# Patient Record
Sex: MALE | Race: WHITE | NOT HISPANIC OR LATINO
[De-identification: names, ages, dates, MRNs, and addresses within clinical notes are randomized per-mention and may not be internally consistent; named-entity substitution may affect disease eponyms.]

---

## 2021-01-25 ENCOUNTER — TRANSCRIPTION ENCOUNTER (OUTPATIENT)
Age: 71
End: 2021-01-25

## 2021-12-09 ENCOUNTER — TRANSCRIPTION ENCOUNTER (OUTPATIENT)
Age: 71
End: 2021-12-09

## 2023-04-24 ENCOUNTER — OFFICE (OUTPATIENT)
Dept: URBAN - METROPOLITAN AREA CLINIC 97 | Facility: CLINIC | Age: 73
Setting detail: OPHTHALMOLOGY
End: 2023-04-24
Payer: MEDICARE

## 2023-04-24 DIAGNOSIS — H35.362: ICD-10-CM

## 2023-04-24 DIAGNOSIS — H26.493: ICD-10-CM

## 2023-04-24 DIAGNOSIS — D31.31: ICD-10-CM

## 2023-04-24 DIAGNOSIS — H35.373: ICD-10-CM

## 2023-04-24 DIAGNOSIS — Z96.1: ICD-10-CM

## 2023-04-24 PROBLEM — H35.363 DRUSEN; BOTH EYES: Status: ACTIVE | Noted: 2023-04-24

## 2023-04-24 PROCEDURE — 92014 COMPRE OPH EXAM EST PT 1/>: CPT | Performed by: OPHTHALMOLOGY

## 2023-04-24 PROCEDURE — 92134 CPTRZ OPH DX IMG PST SGM RTA: CPT | Performed by: OPHTHALMOLOGY

## 2023-04-24 ASSESSMENT — AXIALLENGTH_DERIVED
OS_AL: 23.6672
OS_AL: 23.6183
OD_AL: 23.7912
OS_AL: 23.6183
OS_AL: 22.636

## 2023-04-24 ASSESSMENT — REFRACTION_MANIFEST
OS_SPHERE: +3.00
OD_ADD: +2.75
OD_VA2: 20/20(J1+)
OU_VA: 20/20
OS_ADD: +3.00
OS_AXIS: 175
OS_CYLINDER: +1.00
OS_VA1: 20/30+2
OS_CYLINDER: +1.00
OS_VA2: 20/20(J1+)
OD_VA1: 20/20-
OD_CYLINDER: SPH
OS_SPHERE: +0.25
OD_SPHERE: -0.50
OS_AXIS: 175
OS_SPHERE: +0.25
OS_VA1: 20/30+2
OD_VA1: 20/20-
OD_CYLINDER: SPH
OS_ADD: +2.75
OU_VA: 20/20
OD_ADD: +3.00
OD_SPHERE: -0.50
OS_AXIS: 175
OD_VA2: 20/20(J1+)
OS_CYLINDER: +1.25
OD_CYLINDER: SPH
OS_VA2: 20/20(J1+)
OD_SPHERE: +2.25

## 2023-04-24 ASSESSMENT — KERATOMETRY
OS_K2POWER_DIOPTERS: 43.25
OD_K1POWER_DIOPTERS: 43.25
OD_K2POWER_DIOPTERS: 44.00
OS_AXISANGLE_DEGREES: 168
METHOD_AUTO_MANUAL: AUTO
OS_K1POWER_DIOPTERS: 41.75
OD_AXISANGLE_DEGREES: 037

## 2023-04-24 ASSESSMENT — SPHEQUIV_DERIVED
OS_SPHEQUIV: 0.875
OS_SPHEQUIV: 0.875
OS_SPHEQUIV: 3.5
OD_SPHEQUIV: -0.625
OS_SPHEQUIV: 0.75

## 2023-04-24 ASSESSMENT — CONFRONTATIONAL VISUAL FIELD TEST (CVF)
OS_FINDINGS: FULL
OD_FINDINGS: FULL

## 2023-04-24 ASSESSMENT — REFRACTION_AUTOREFRACTION
OS_AXIS: 173
OD_CYLINDER: +1.25
OS_CYLINDER: +1.25
OS_SPHERE: +0.25
OD_SPHERE: -1.25
OD_AXIS: 018

## 2023-04-24 ASSESSMENT — VISUAL ACUITY
OS_BCVA: 20/25-2
OD_BCVA: 20/40+2

## 2023-10-09 ENCOUNTER — OFFICE (OUTPATIENT)
Dept: URBAN - METROPOLITAN AREA CLINIC 97 | Facility: CLINIC | Age: 73
Setting detail: OPHTHALMOLOGY
End: 2023-10-09
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
## 2023-10-09 PROCEDURE — NO SHOW FE NO SHOW FEE: Performed by: OPHTHALMOLOGY

## 2024-05-03 ENCOUNTER — OFFICE (OUTPATIENT)
Dept: URBAN - METROPOLITAN AREA CLINIC 8 | Facility: CLINIC | Age: 74
Setting detail: OPHTHALMOLOGY
End: 2024-05-03
Payer: MEDICARE

## 2024-05-03 DIAGNOSIS — Z96.1: ICD-10-CM

## 2024-05-03 DIAGNOSIS — H35.363: ICD-10-CM

## 2024-05-03 DIAGNOSIS — H26.493: ICD-10-CM

## 2024-05-03 DIAGNOSIS — H35.373: ICD-10-CM

## 2024-05-03 DIAGNOSIS — D31.31: ICD-10-CM

## 2024-05-03 PROCEDURE — 92134 CPTRZ OPH DX IMG PST SGM RTA: CPT | Performed by: OPHTHALMOLOGY

## 2024-05-03 PROCEDURE — 99214 OFFICE O/P EST MOD 30 MIN: CPT | Performed by: OPHTHALMOLOGY

## 2024-05-03 ASSESSMENT — CONFRONTATIONAL VISUAL FIELD TEST (CVF)
OS_FINDINGS: FULL
OD_FINDINGS: FULL

## 2024-05-15 ENCOUNTER — EMERGENCY (EMERGENCY)
Facility: HOSPITAL | Age: 74
LOS: 1 days | Discharge: HOME | End: 2024-05-15
Attending: EMERGENCY MEDICINE
Payer: COMMERCIAL

## 2024-05-15 VITALS
TEMPERATURE: 98 F | DIASTOLIC BLOOD PRESSURE: 82 MMHG | RESPIRATION RATE: 17 BRPM | OXYGEN SATURATION: 99 % | HEART RATE: 57 BPM | SYSTOLIC BLOOD PRESSURE: 158 MMHG

## 2024-05-15 VITALS
HEIGHT: 67 IN | HEART RATE: 69 BPM | TEMPERATURE: 98 F | RESPIRATION RATE: 18 BRPM | SYSTOLIC BLOOD PRESSURE: 173 MMHG | WEIGHT: 164.02 LBS | DIASTOLIC BLOOD PRESSURE: 92 MMHG | OXYGEN SATURATION: 99 %

## 2024-05-15 PROCEDURE — 73020 X-RAY EXAM OF SHOULDER: CPT | Mod: 26,59,LT

## 2024-05-15 PROCEDURE — 73030 X-RAY EXAM OF SHOULDER: CPT

## 2024-05-15 PROCEDURE — 71045 X-RAY EXAM CHEST 1 VIEW: CPT

## 2024-05-15 PROCEDURE — 72125 CT NECK SPINE W/O DYE: CPT | Mod: 26,MC

## 2024-05-15 PROCEDURE — 72125 CT NECK SPINE W/O DYE: CPT | Mod: MC

## 2024-05-15 PROCEDURE — 73562 X-RAY EXAM OF KNEE 3: CPT | Mod: 26,LT

## 2024-05-15 PROCEDURE — 70450 CT HEAD/BRAIN W/O DYE: CPT | Mod: MC

## 2024-05-15 PROCEDURE — 99285 EMERGENCY DEPT VISIT HI MDM: CPT

## 2024-05-15 PROCEDURE — 73562 X-RAY EXAM OF KNEE 3: CPT

## 2024-05-15 PROCEDURE — 72170 X-RAY EXAM OF PELVIS: CPT

## 2024-05-15 PROCEDURE — 70450 CT HEAD/BRAIN W/O DYE: CPT | Mod: 26,MC

## 2024-05-15 PROCEDURE — 71045 X-RAY EXAM CHEST 1 VIEW: CPT | Mod: 26

## 2024-05-15 PROCEDURE — 99284 EMERGENCY DEPT VISIT MOD MDM: CPT | Mod: 25

## 2024-05-15 PROCEDURE — 72170 X-RAY EXAM OF PELVIS: CPT | Mod: 26

## 2024-05-15 PROCEDURE — 73030 X-RAY EXAM OF SHOULDER: CPT | Mod: 26,LT

## 2024-05-15 PROCEDURE — 73020 X-RAY EXAM OF SHOULDER: CPT

## 2024-05-15 RX ORDER — CYCLOBENZAPRINE HYDROCHLORIDE 10 MG/1
1 TABLET, FILM COATED ORAL
Qty: 5 | Refills: 0
Start: 2024-05-15 | End: 2024-05-19

## 2024-05-15 RX ORDER — ACETAMINOPHEN 500 MG
650 TABLET ORAL ONCE
Refills: 0 | Status: COMPLETED | OUTPATIENT
Start: 2024-05-15 | End: 2024-05-15

## 2024-05-15 RX ORDER — LISINOPRIL 2.5 MG/1
1 TABLET ORAL
Qty: 2 | Refills: 0
Start: 2024-05-15 | End: 2024-05-16

## 2024-05-15 RX ORDER — MELOXICAM 15 MG/1
1 TABLET ORAL
Qty: 7 | Refills: 0
Start: 2024-05-15 | End: 2024-05-21

## 2024-05-15 RX ORDER — LIDOCAINE 4 G/100G
1 CREAM TOPICAL
Qty: 1 | Refills: 0
Start: 2024-05-15 | End: 2024-05-19

## 2024-05-15 RX ORDER — LIDOCAINE 4 G/100G
1 CREAM TOPICAL ONCE
Refills: 0 | Status: COMPLETED | OUTPATIENT
Start: 2024-05-15 | End: 2024-05-15

## 2024-05-15 RX ADMIN — LIDOCAINE 1 PATCH: 4 CREAM TOPICAL at 18:36

## 2024-05-15 RX ADMIN — Medication 650 MILLIGRAM(S): at 18:36
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
## 2024-05-15 NOTE — ED PROVIDER NOTE - PATIENT PORTAL LINK FT
You can access the FollowMyHealth Patient Portal offered by [facility] by registering at the following website: [email or web address]. By joining [facility]’s FollowMyHealth portal, you will also be able to view your health information using other applications (apps) compatible with our system.
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
## 2024-05-15 NOTE — ED PROVIDER NOTE - CARE PROVIDER_API CALL
Neymar Weir  Orthopaedic Surgery  [address]  [place] [address]  Phone: [phone]  Fax: [phone]  Follow Up Time: 1-3 Days

## 2024-05-15 NOTE — ED ADULT NURSE NOTE - NSFALLUNIVINTERV_ED_ALL_ED
Bed/Stretcher in lowest position, wheels locked, appropriate side rails in place/Call bell, personal items and telephone in reach/Instruct patient to call for assistance before getting out of bed/chair/stretcher/Non-slip footwear applied when patient is off stretcher/[place] to call system/Physically safe environment - no spills, clutter or unnecessary equipment/Purposeful proactive rounding/Room/bathroom lighting operational, light cord in reach

## 2024-05-15 NOTE — ED PROVIDER NOTE - PHYSICAL EXAMINATION
PRIMARY SURVEY:  AIRWAY: Upper Airway intact.  Trachea midline.  Patient phonating well.  BREATHING:  Breath sounds present bilaterally. (-) crepitation.   CIRCULATION:  Good pulses bilaterally.  capillary refill < 2sec.  DISABILITY:  Patient is awake and alert.  Initial GCS = 15.    SECONDARY SURVEY:  SKIN:  Warm, dry; (-) cyanosis.  HEAD:  (-) scalp swelling, (-) scalp tenderness  EYES:  PERRL, EOMI.  FACE:   (-) deformity, (-) crepitance.  (-) wounds.  EARS: (-) hemotympanum.  MOUTH: Teeth occlude normally. (-) signs of trauma   NECK:  (-) paravertebral tenderness, (-) midline tenderness; (-) crepitus, (-) "step-off".  CHEST:  (-) tenderness; (-) crepitus.   LUNGS:  (+) breath sounds equal bilaterally.  (-) wheezes, (-) rhonchi, (-) rales.  HEART AND CARDIOVASCULAR:  (-) irregularity; (-) murmur, (-) gallop.  ABDOMEN AND GI:  (-) ecchymosis, (-) abrasion, (-) distention, (-) tenderness, (-) guarding, (-) rebound, (-) rigidity  SPINE: (-) C/T/L-spine midline tenderness, deformity, step-off, or para vertebral tenderness.  PELVIS:  (-) pelvis tenderness  EXTREMITY:  (+) left knee anterior abrasion, left shoulder able to passively be ranged on forward flexion and to 90 deg to abduction, pain with active range of motion. Intact at elbow.  NEURO AND PSYCH:  GCS 15.  Strength, sensation WNL.  (-) focal neurologic deficits.
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
## 2024-05-18 PROBLEM — Z00.00 ENCOUNTER FOR PREVENTIVE HEALTH EXAMINATION: Status: ACTIVE | Noted: 2024-05-18

## 2024-09-05 ENCOUNTER — OFFICE (OUTPATIENT)
Dept: URBAN - METROPOLITAN AREA CLINIC 97 | Facility: CLINIC | Age: 74
Setting detail: OPHTHALMOLOGY
End: 2024-09-05
Payer: MEDICARE

## 2024-09-05 ENCOUNTER — RX ONLY (RX ONLY)
Age: 74
End: 2024-09-05

## 2024-09-05 DIAGNOSIS — H26.491: ICD-10-CM

## 2024-09-05 PROCEDURE — 66821 AFTER CATARACT LASER SURGERY: CPT | Mod: RT | Performed by: OPHTHALMOLOGY

## 2024-09-05 ASSESSMENT — CONFRONTATIONAL VISUAL FIELD TEST (CVF)
OD_FINDINGS: FULL
OS_FINDINGS: FULL

## 2024-09-20 ENCOUNTER — RX ONLY (RX ONLY)
Age: 74
End: 2024-09-20

## 2024-09-20 ENCOUNTER — OFFICE (OUTPATIENT)
Dept: URBAN - METROPOLITAN AREA CLINIC 97 | Facility: CLINIC | Age: 74
Setting detail: OPHTHALMOLOGY
End: 2024-09-20
Payer: MEDICARE

## 2024-09-20 DIAGNOSIS — H26.492: ICD-10-CM

## 2024-09-20 PROCEDURE — 66821 AFTER CATARACT LASER SURGERY: CPT | Mod: 79,LT | Performed by: OPHTHALMOLOGY

## 2024-09-20 ASSESSMENT — CONFRONTATIONAL VISUAL FIELD TEST (CVF)
OS_FINDINGS: FULL
OD_FINDINGS: FULL

## 2024-10-02 ENCOUNTER — OFFICE (OUTPATIENT)
Dept: URBAN - METROPOLITAN AREA CLINIC 97 | Facility: CLINIC | Age: 74
Setting detail: OPHTHALMOLOGY
End: 2024-10-02
Payer: MEDICARE

## 2024-10-02 DIAGNOSIS — H35.363: ICD-10-CM

## 2024-10-02 DIAGNOSIS — Z96.1: ICD-10-CM

## 2024-10-02 DIAGNOSIS — H35.373: ICD-10-CM

## 2024-10-02 DIAGNOSIS — H26.492: ICD-10-CM

## 2024-10-02 PROBLEM — H35.3131 AGE RELATED MACULAR DEGENERATION DRY; BOTH EYES EARLY: Status: ACTIVE | Noted: 2024-10-02

## 2024-10-02 PROCEDURE — 92134 CPTRZ OPH DX IMG PST SGM RTA: CPT | Performed by: OPHTHALMOLOGY

## 2024-10-02 PROCEDURE — 99213 OFFICE O/P EST LOW 20 MIN: CPT | Performed by: OPHTHALMOLOGY

## 2024-10-02 ASSESSMENT — REFRACTION_MANIFEST
OD_ADD: +2.75
OS_ADD: +2.75
OS_SPHERE: PLANO
OD_AXIS: 010
OS_VA1: 20/20
OS_AXIS: 175
OS_VA2: 20/20(J1+)
OD_SPHERE: -1.00
OS_VA1: 20/30
OU_VA: 20/20
OS_SPHERE: PLANO
OD_CYLINDER: +1.00
OD_CYLINDER: SPH
OU_VA: 20/20
OD_VA1: 20/20
OD_AXIS: 010
OD_CYLINDER: +0.50
OS_VA2: 20/20(J1+)
OD_SPHERE: +2.25
OS_AXIS: 170
OD_ADD: +2.75
OD_VA2: 20/20(J1+)
OD_SPHERE: -0.50
OS_AXIS: 170
OS_SPHERE: +3.00
OS_ADD: +2.75
OD_VA1: 20/20-
OS_CYLINDER: +1.00
OS_CYLINDER: +1.75
OS_CYLINDER: +2.00
OD_VA2: 20/20(J1+)

## 2024-10-02 ASSESSMENT — REFRACTION_AUTOREFRACTION
OD_SPHERE: -1.00
OD_AXIS: 008
OS_SPHERE: PLANO
OD_CYLINDER: +1.00
OS_AXIS: 170
OS_CYLINDER: +2.25

## 2024-10-02 ASSESSMENT — KERATOMETRY
OD_K1POWER_DIOPTERS: 43.25
OS_K2POWER_DIOPTERS: 43.50
OS_AXISANGLE_DEGREES: 167
METHOD_AUTO_MANUAL: AUTO
OD_AXISANGLE_DEGREES: 026
OD_K2POWER_DIOPTERS: 44.00
OS_K1POWER_DIOPTERS: 41.75

## 2024-10-02 ASSESSMENT — REFRACTION_CURRENTRX
OD_SPHERE: +2.25
OS_SPHERE: +2.25
OS_OVR_VA: 20/
OS_VPRISM_DIRECTION: SV
OD_VPRISM_DIRECTION: SV
OD_OVR_VA: 20/

## 2024-10-02 ASSESSMENT — TONOMETRY
OD_IOP_MMHG: 14
OS_IOP_MMHG: 16

## 2024-10-02 ASSESSMENT — CONFRONTATIONAL VISUAL FIELD TEST (CVF)
OD_FINDINGS: FULL
OS_FINDINGS: FULL

## 2024-10-02 ASSESSMENT — VISUAL ACUITY
OS_BCVA: 20/20-2
OD_BCVA: 20/60

## 2024-11-07 ENCOUNTER — OFFICE (OUTPATIENT)
Dept: URBAN - METROPOLITAN AREA CLINIC 97 | Facility: CLINIC | Age: 74
Setting detail: OPHTHALMOLOGY
End: 2024-11-07
Payer: MEDICARE

## 2024-11-07 DIAGNOSIS — H35.373: ICD-10-CM

## 2024-11-07 DIAGNOSIS — Z96.1: ICD-10-CM

## 2024-11-07 DIAGNOSIS — H35.3131: ICD-10-CM

## 2024-11-07 DIAGNOSIS — H26.493: ICD-10-CM

## 2024-11-07 DIAGNOSIS — H35.722: ICD-10-CM

## 2024-11-07 PROCEDURE — 99024 POSTOP FOLLOW-UP VISIT: CPT | Performed by: OPHTHALMOLOGY

## 2024-11-07 ASSESSMENT — REFRACTION_MANIFEST
OS_VA2: 20/20(J1+)
OD_ADD: +2.75
OS_CYLINDER: +1.50
OS_AXIS: 175
OD_AXIS: 010
OD_CYLINDER: SPH
OS_ADD: +3.00
OS_AXIS: 165
OS_VA1: 20/25-
OD_VA2: 20/20(J1+)
OS_SPHERE: +3.00
OD_CYLINDER: +1.00
OD_SPHERE: -0.75
OD_SPHERE: -1.00
OD_VA1: 20/20
OS_SPHERE: PLANO
OD_AXIS: 020
OD_SPHERE: +2.25
OD_ADD: +3.00
OU_VA: 20/20
OS_AXIS: 170
OU_VA: 20/20
OS_CYLINDER: +1.00
OS_ADD: +2.75
OD_CYLINDER: +0.75
OS_CYLINDER: +2.00
OD_VA2: 20/20(J1+)
OS_VA1: 20/25
OS_SPHERE: PLANO
OS_VA2: 20/20(J1+)
OD_VA1: 20/20-2

## 2024-11-07 ASSESSMENT — CORNEAL SURGICAL SCARRING: OS_SCARRING: STROMAL

## 2024-11-07 ASSESSMENT — REFRACTION_CURRENTRX
OS_VPRISM_DIRECTION: SV
OD_SPHERE: +2.25
OD_OVR_VA: 20/
OS_OVR_VA: 20/
OD_VPRISM_DIRECTION: SV
OS_SPHERE: +2.25

## 2024-11-07 ASSESSMENT — KERATOMETRY
OD_K1POWER_DIOPTERS: 43.00
OS_K1POWER_DIOPTERS: 41.50
OS_K2POWER_DIOPTERS: 43.50
OS_AXISANGLE_DEGREES: 164
METHOD_AUTO_MANUAL: AUTO
OD_K2POWER_DIOPTERS: 43.75
OD_AXISANGLE_DEGREES: 023

## 2024-11-07 ASSESSMENT — CONFRONTATIONAL VISUAL FIELD TEST (CVF)
OS_FINDINGS: FULL
OD_FINDINGS: FULL

## 2024-11-07 ASSESSMENT — TONOMETRY: OS_IOP_MMHG: 15

## 2024-11-07 ASSESSMENT — VISUAL ACUITY
OD_BCVA: 20/60
OS_BCVA: 20/25+

## 2024-11-07 ASSESSMENT — REFRACTION_AUTOREFRACTION
OD_SPHERE: -1.25
OD_CYLINDER: +1.25
OS_SPHERE: PLANO
OS_CYLINDER: +1.50
OD_AXIS: 017
OS_AXIS: 164

## 2024-12-20 ENCOUNTER — RX ONLY (RX ONLY)
Age: 74
End: 2024-12-20

## 2024-12-20 ENCOUNTER — OFFICE (OUTPATIENT)
Dept: URBAN - METROPOLITAN AREA CLINIC 97 | Facility: CLINIC | Age: 74
Setting detail: OPHTHALMOLOGY
End: 2024-12-20
Payer: MEDICARE

## 2024-12-20 DIAGNOSIS — H52.4: ICD-10-CM

## 2024-12-20 DIAGNOSIS — H17.9: ICD-10-CM

## 2024-12-20 DIAGNOSIS — H35.722: ICD-10-CM

## 2024-12-20 DIAGNOSIS — Z96.1: ICD-10-CM

## 2024-12-20 DIAGNOSIS — H35.3131: ICD-10-CM

## 2024-12-20 DIAGNOSIS — H26.493: ICD-10-CM

## 2024-12-20 DIAGNOSIS — D31.31: ICD-10-CM

## 2024-12-20 DIAGNOSIS — H35.373: ICD-10-CM

## 2024-12-20 PROCEDURE — 92012 INTRM OPH EXAM EST PATIENT: CPT | Performed by: OPHTHALMOLOGY

## 2024-12-20 PROCEDURE — 92015 DETERMINE REFRACTIVE STATE: CPT | Performed by: OPHTHALMOLOGY

## 2024-12-20 PROCEDURE — 92134 CPTRZ OPH DX IMG PST SGM RTA: CPT | Performed by: OPHTHALMOLOGY

## 2024-12-20 ASSESSMENT — REFRACTION_MANIFEST
OD_AXIS: 020
OS_CYLINDER: +1.50
OS_AXIS: 165
OD_VA2: 20/20(J1+)
OS_AXIS: 170
OS_VA2: 20/20(J1+)
OU_VA: 20/20
OS_CYLINDER: +1.00
OD_ADD: +2.75
OS_CYLINDER: +2.00
OU_VA: 20/20
OD_VA1: 20/20
OS_SPHERE: PLANO
OS_ADD: +3.00
OD_SPHERE: +2.25
OS_SPHERE: PLANO
OD_CYLINDER: +0.75
OD_CYLINDER: +0.75
OS_VA1: 20/25-
OS_VA2: 20/20(J1+)
OS_ADD: +2.75
OD_ADD: +3.00
OD_VA1: 20/20-2
OS_AXIS: 175
OD_AXIS: 010
OD_VA2: 20/20(J1+)
OD_SPHERE: -0.50
OS_VA1: 20/20-
OD_CYLINDER: SPH
OD_SPHERE: -0.75
OS_SPHERE: +3.00

## 2024-12-20 ASSESSMENT — KERATOMETRY
OS_AXISANGLE_DEGREES: 166
OD_K1POWER_DIOPTERS: 43.00
OD_K2POWER_DIOPTERS: 44.00
METHOD_AUTO_MANUAL: AUTO
OS_K1POWER_DIOPTERS: 41.75
OS_K2POWER_DIOPTERS: 43.50
OD_AXISANGLE_DEGREES: 022

## 2024-12-20 ASSESSMENT — REFRACTION_CURRENTRX
OD_OVR_VA: 20/
OS_OVR_VA: 20/
OD_VPRISM_DIRECTION: SV
OS_SPHERE: +2.25
OS_VPRISM_DIRECTION: SV
OD_SPHERE: +2.25

## 2024-12-20 ASSESSMENT — REFRACTION_AUTOREFRACTION
OS_CYLINDER: +2.00
OD_AXIS: 022
OD_SPHERE: -1.25
OS_SPHERE: PLANO
OS_AXIS: 169
OD_CYLINDER: +1.00

## 2024-12-20 ASSESSMENT — CONFRONTATIONAL VISUAL FIELD TEST (CVF)
OS_FINDINGS: FULL
OD_FINDINGS: FULL

## 2024-12-20 ASSESSMENT — VISUAL ACUITY
OD_BCVA: 20/50+2
OS_BCVA: 20/20-2

## 2024-12-20 ASSESSMENT — CORNEAL SURGICAL SCARRING: OS_SCARRING: STROMAL
